# Patient Record
Sex: MALE | Race: WHITE | URBAN - METROPOLITAN AREA
[De-identification: names, ages, dates, MRNs, and addresses within clinical notes are randomized per-mention and may not be internally consistent; named-entity substitution may affect disease eponyms.]

---

## 2018-09-04 ENCOUNTER — OFFICE VISIT (OUTPATIENT)
Dept: URGENT CARE | Facility: URGENT CARE | Age: 58
End: 2018-09-04
Payer: COMMERCIAL

## 2018-09-04 VITALS
OXYGEN SATURATION: 93 % | HEART RATE: 74 BPM | TEMPERATURE: 97.8 F | WEIGHT: 201.4 LBS | SYSTOLIC BLOOD PRESSURE: 138 MMHG | DIASTOLIC BLOOD PRESSURE: 80 MMHG

## 2018-09-04 DIAGNOSIS — L25.5 CONTACT DERMATITIS DUE TO PLANTS, EXCEPT FOOD, UNSPECIFIED CONTACT DERMATITIS TYPE: Primary | ICD-10-CM

## 2018-09-04 PROCEDURE — 99203 OFFICE O/P NEW LOW 30 MIN: CPT | Performed by: PHYSICIAN ASSISTANT

## 2018-09-04 RX ORDER — PREDNISONE 20 MG/1
40 TABLET ORAL DAILY
Qty: 10 TABLET | Refills: 0 | Status: SHIPPED | OUTPATIENT
Start: 2018-09-04 | End: 2018-09-09

## 2018-09-04 RX ORDER — LISINOPRIL 10 MG/1
10 TABLET ORAL
COMMUNITY
Start: 2015-12-04

## 2018-09-04 NOTE — MR AVS SNAPSHOT
After Visit Summary   9/4/2018    Kendrick Chung    MRN: [id number]           Patient Information     Date Of Birth          1960        Visit Information        Provider Department      9/4/2018 7:15 PM Brittnee Aguilar PA-C [facility]        Today's Diagnoses     Contact dermatitis due to plants, except food, unspecified contact dermatitis type    -  1      Care Instructions    Benadryl at bedtime          Follow-ups after your visit        Who to contact     If you have questions or need follow up information about today's clinic visit or your schedule please contact [facility] directly at [phone].  Normal or non-critical lab and imaging results will be communicated to you by MyChart, letter or phone within 4 business days after the clinic has received the results. If you do not hear from us within 7 days, please contact the clinic through MyChart or phone. If you have a critical or abnormal lab result, we will notify you by phone as soon as possible.  Submit refill requests through [employer] or call your pharmacy and they will forward the refill request to us. Please allow 3 business days for your refill to be completed.          Additional Information About Your Visit        Care EveryWhere ID     This is your Care EveryWhere ID. This could be used by other organizations to access your [place] medical records  [id number]        Your Vitals Were     Pulse Temperature Pulse Oximetry             74 97.8  F (36.6  C) (Tympanic) 93%          Blood Pressure from Last 3 Encounters:   09/04/18 138/80    Weight from Last 3 Encounters:   09/04/18 201 lb 6.4 oz (91.4 kg)              Today, you had the following     No orders found for display         Today's Medication Changes          These changes are accurate as of 9/4/18  8:53 PM.  If you have any questions, ask your nurse or doctor.               Start taking these medicines.        Dose/Directions     predniSONE 20 MG tablet   Commonly known as:  DELTASONE   Used for:  Contact dermatitis due to plants, except food, unspecified contact dermatitis type        Dose:  40 mg   Take 2 tablets (40 mg) by mouth daily for 5 days   Quantity:  10 tablet   Refills:  0            Where to get your medicines      These medications were sent to [id number] - [place], [place] - [address] [address]  [address] [address]     Phone:  [phone]     predniSONE 20 MG tablet                Primary Care Provider Office Phone # Fax #    [facility] [phone] [address] [address] [address]        Equal Access to Services     CESAR JARRELL : Hadii venessa beltran hadasho Soomaali, waaxda luqadaha, qaybta kaalmada adeegyada, kaila lynch . So [facility] [phone].    ATENCIÓN: Si habla español, tiene a mora disposición servicios gratuitos de asistencia lingüística. [facility] [phone].    We comply with applicable federal civil rights laws and [place] laws. We do not discriminate on the basis of race, color, national origin, age, disability, sex, sexual orientation, or gender identity.            Thank you!     Thank you for choosing [facility]  for your care. Our goal is always to provide you with excellent care. Hearing back from our patients is one way we can continue to improve our services. Please take a few minutes to complete the written survey that you may receive in the mail after your visit with us. Thank you!             Your Updated Medication List - Protect others around you: Learn how to safely use, store and throw away your medicines at www.disposemymeds.org.          This list is accurate as of 9/4/18  8:53 PM.  Always use your most recent med list.                   Brand Name Dispense Instructions for use Diagnosis    aspirin 325 MG EC tablet      Take 325 mg by mouth        lisinopril 10 MG  tablet    PRINIVIL/ZESTRIL     Take 10 mg by mouth        predniSONE 20 MG tablet    DELTASONE    10 tablet    Take 2 tablets (40 mg) by mouth daily for 5 days    Contact dermatitis due to plants, except food, unspecified contact dermatitis type

## 2022-11-11 NOTE — PROGRESS NOTES
[facility] testing submitted on specimen [id number] per Dr. Flores.    S: 58 yo male with pruritic rash for 2 days.  Located under both eyes in between his fingers on the back of his neck and on his legs.  [place] get to an [place].  No fever.  No difficulty breathing.  No cough or sore throat.  Allergies not on file    No past medical history on file.  No history of seasonal allergies or asthma.    No current outpatient prescriptions on file prior to visit.  No current facility-administered medications on file prior to visit.     Social History   Substance Use Topics     Smoking status: Not on file     Smokeless tobacco: Not on file     Alcohol use Not on file       ROS:  CONSTITUTIONAL: Negative for fatigue or fever.  EYES: Negative for eye problems.  ENT: negative.  RESP: negative.  CV: Negative for chest pains.  GI: Negative for vomiting.  MUSCULOSKELETAL:  Negative for significant muscle or joint pains.  NEUROLOGIC: Negative for headaches.  SKIN: as above    OBJECTIVE:  /80  Pulse 74  Temp 97.8  F (36.6  C) (Tympanic)  Wt 201 lb 6.4 oz (91.4 kg)  SpO2 93%  GENERAL APPEARANCE: Healthy, alert and no distress.  EYES:Conjunctiva/sclera clear.  No periorbital tenderness.  THROAT: No erythema w/o tonsillar enlargement . No tongue swelling.  NECK: Supple, nontender, no lymphadenopathy.  RESP: Lungs clear to auscultation - no rales, rhonchi or wheezes  CV: Regular rate and rhythm, normal S1 S2, no murmur noted.  NEURO: Awake, alert    SKIN: Upper cheeks with red papular rash.  Some swelling.  Right hand between fingers with blister type lesions.  Scattered red papular lesions and blisters on his legs.        ASSESSMENT:     ICD-10-CM    1. Contact dermatitis due to plants, except food, unspecified contact dermatitis type L25.5 predniSONE (DELTASONE) 20 MG tablet           PLAN: Benadryl at bedtime.  OTC nondrowsy Zyrtec or Allegra during the day.  Prednisone as directed.  Lots of rest and fluids.  RTC if any worsening symptoms or  if not improving.    Brittnee Aguilar PA-C